# Patient Record
Sex: FEMALE | Race: WHITE | NOT HISPANIC OR LATINO
[De-identification: names, ages, dates, MRNs, and addresses within clinical notes are randomized per-mention and may not be internally consistent; named-entity substitution may affect disease eponyms.]

---

## 2019-11-05 ENCOUNTER — RESULT REVIEW (OUTPATIENT)
Age: 24
End: 2019-11-05

## 2020-10-13 ENCOUNTER — RESULT REVIEW (OUTPATIENT)
Age: 25
End: 2020-10-13

## 2022-08-15 PROBLEM — Z00.00 ENCOUNTER FOR PREVENTIVE HEALTH EXAMINATION: Status: ACTIVE | Noted: 2022-08-15

## 2022-08-16 ENCOUNTER — APPOINTMENT (OUTPATIENT)
Dept: ORTHOPEDIC SURGERY | Facility: CLINIC | Age: 27
End: 2022-08-16

## 2022-08-16 VITALS — BODY MASS INDEX: 19.99 KG/M2 | HEIGHT: 65 IN | WEIGHT: 120 LBS

## 2022-08-16 DIAGNOSIS — S69.91XA UNSPECIFIED INJURY OF RIGHT WRIST, HAND AND FINGER(S), INITIAL ENCOUNTER: ICD-10-CM

## 2022-08-16 PROCEDURE — 73130 X-RAY EXAM OF HAND: CPT | Mod: 50

## 2022-08-16 PROCEDURE — 99072 ADDL SUPL MATRL&STAF TM PHE: CPT

## 2022-08-16 PROCEDURE — 99455 WORK RELATED DISABILITY EXAM: CPT

## 2022-08-16 PROCEDURE — 73110 X-RAY EXAM OF WRIST: CPT | Mod: 50

## 2022-08-16 NOTE — HISTORY OF PRESENT ILLNESS
[Has the patient missed work because of the injury/illness?] : The patient has missed work because of the injury/illness. [No] : The patient is currently not working. 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 She was treated at urgent care. Xrays were done showing a boutonniere deformity. MRI done on 7/18/22 showed mild bone marrow edema around the 4th PIP joints suggestive of a contusion. No evidence of a fracture. No evidence of tendon tear. 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 [FreeTextEntry4] : Splint made in [place] at urgent care along with finger splint and hand splint. MRI done 7/18/22 [FreeTextEntry5] : No

## 2022-08-16 NOTE — ASSESSMENT
[FreeTextEntry1] : 7 weeks status post right wrist injury which localizes to the scapholunate ligament and TFCC not improving despite 6 weeks of splinting and home program.\par \par Residual right fourth PIP swelling after trauma with PIP stiffness which may be related to ligamentous injury.  Stretching of the central slip may be considered despite negative MRIs.\par Patient also demonstrates flexor tendinitis at the level of the left fourth digit A1 pulley.  This is without locking\par \par Differential diagnosis was discussed.\par \par Patient would like to proceed with formal therapy for the right fourth digits and symptoms or not improving with rest and time\par \par We will proceed with an MRI of the right wrist to better assess the ligamentous injury and rule out any occult carpal fractures.\par She was encouraged to continue immobilization of the right wrist while awaiting MRI\par Based on the response to therapy for the right fourth digit and MRI of the right wrist a definitive plan will be considered.\par \par She is scheduled to begin a travel show with [employer] starting on August 25, 2022.  At this time the patient does not feel she will be able to do this but is hopeful that with time and treatment that she may be able to make this date.\par \par This will be further discussed once the results of the MRI are obtained and we assess the response to therapy.

## 2022-08-16 NOTE — PHYSICAL EXAM
[de-identified] : Physical exam shows the patient to be alert and oriented x3, capable of ambulation. The patient is well-developed and well-nourished in no apparent respiratory distress. Majority of the skin is intact bilaterally in the upper extremities without lymphadenopathy at the elbows.\par \par There is tenderness localized over the scapholunate ligament interval of the right wrist as well as the TFCC but without evidence of instability with full range of motion of the wrist bilaterally.  There is no tenderness over the distal radius or CMC joints symmetric bilaterally.\par \par There is swelling and tenderness localized over the right fourth PIP joint specifically over the radial and ulnar collateral ligaments but no evidence of instability of these ligaments.  FDS FDP and extensor mechanism appear to be intact.\par \par Range of motion is 0/90 of the MP 15/80 of the PIP and 0/45 of the DIP joint.\par Passively the range of motion of the PIP goes to 90 and the DIP goes to 60 with soft endpoints\par There is slight hyperextension of the DIP joints of the right third and fourth digits which is similar on the left side.  M[place]geta test shows slight straightening of the DIP joint upon active extension which is similar to the third digit.  \par \par There is good capillary refill of the digits bilaterally.There is no masses or sensitivity over the median and ulnar nerves at the level of the wrist. There is a negative Tinel's and negative Phalen's sign bilaterally. The sensation is grossly intact bilaterally. [de-identified] : PA lateral and oblique of both hands shows joint space is symmetric bilaterally with slight hyperextension of the third DIP joints symmetric bilaterally and slightly greater hyperextension of DIP joint on the right greater than left fourth.  No evidence of fractures or dislocations.\par \par Scapholunate ligament stress views in neutral radial and ulnar deviation shows no evidence of fractures or dislocations but slight widening of the right scapholunate ligament interval is appreciated as compared to the left.  This reduces and radial deviation.\par \par MRI from July 16, 2022 shows mild bone marrow edema of the fourth PIP joint suggestive of contusion but no evidence of fracture.  The flexor and extensor tendons are normal without evidence of tendon tear

## 2022-08-16 NOTE — CONSULT LETTER
[Dear  ___] : Dear  [unfilled], [Consult Letter:] : I had the pleasure of evaluating your patient, [unfilled]. [Please see my note below.] : Please see my note below. [Consult Closing:] : Thank you very much for allowing me to participate in the care of this patient.  If you have any questions, please do not hesitate to contact me. [Sincerely,] : Sincerely, [FreeTextEntry3] : Nelson Rush M.D., FAAOS\par Co-Director\par [facility]\par \par

## 2022-08-18 ENCOUNTER — TRANSCRIPTION ENCOUNTER (OUTPATIENT)
Age: 27
End: 2022-08-18

## 2022-08-22 ENCOUNTER — NON-APPOINTMENT (OUTPATIENT)
Age: 27
End: 2022-08-22

## 2022-08-23 ENCOUNTER — NON-APPOINTMENT (OUTPATIENT)
Age: 27
End: 2022-08-23

## 2022-08-24 ENCOUNTER — APPOINTMENT (OUTPATIENT)
Dept: ORTHOPEDIC SURGERY | Facility: CLINIC | Age: 27
End: 2022-08-24

## 2022-08-24 VITALS — BODY MASS INDEX: 19.99 KG/M2 | WEIGHT: 120 LBS | RESPIRATION RATE: 16 BRPM | HEIGHT: 65 IN

## 2022-08-24 PROCEDURE — 20600 DRAIN/INJ JOINT/BURSA W/O US: CPT | Mod: RT

## 2022-08-24 PROCEDURE — 99213 OFFICE O/P EST LOW 20 MIN: CPT | Mod: 25

## 2022-08-24 PROCEDURE — 20605 DRAIN/INJ JOINT/BURSA W/O US: CPT | Mod: RT

## 2022-08-24 PROCEDURE — 99072 ADDL SUPL MATRL&STAF TM PHE: CPT

## 2022-08-24 NOTE — ASSESSMENT
[FreeTextEntry1] : Status post right TFCC and partial scapholunate ligament tear which is improving slowly with therapy and splinting.\par \par Right fourth finger also improving with therapy at a much faster rate.\par \par Risk benefits and alternatives of treatment were discussed ranging from conservative to surgical intervention.\par \par Risk associated with each option discussed and all questions answered.\par \par Patient would like to proceed with an injection for the right wrist TFCC and scapholunate ligament interval\par \par After the area was cleaned with alcohol to reduce the risk of infection, a half a cc of Kenalog 10-1/2 a cc of 2% lidocaine was placed into the region. Hemostasis was obtained by direct pressure and a Band-Aid applied.  Right TFCC and scapholunate ligament interval\par \par Patient will continue intermittent splinting activity modifications and therapy.\par \par She will be provided with a note stating that she is able to perform without the splints when she feels comfortable but they should be worn during any high risk activity where potential reinjury could occur.\par \par She will continue therapy and home program and we will reevaluate in 4 weeks.

## 2022-08-24 NOTE — HISTORY OF PRESENT ILLNESS
[Right] : right hand dominant [Has the patient missed work because of the injury/illness?] : The patient has missed work because of the injury/illness. [No] : The patient is currently not working. [FreeTextEntry1] : Date of injury: 6/22/2022\par \par Patient here 2 months status post right wrist injury which localized to the scapholunate ligament and TFCC on last exam.  Patient here for possible injection to the TFCC or scapholunate ligament area.  Patient also has right fourth PIP swelling and stiffness related to ligamentous injury.\par \par Patient here to discuss most recent MRI which showed TFCC tear and volar fiber tear of scapholunate ligament.  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

## 2022-08-24 NOTE — PHYSICAL EXAM
[de-identified] : There is residual swelling and tenderness over the right fourth PIP joint as well as the wrist.\par \par There is no instability of the scapholunate ligament or TFCC but there is discomfort upon forced pronation supination flexion extension.  No tenderness over the pisotriquetral or hamate hook.\par \par No tenderness over the area of the ganglion cyst which was visualized on MRI.\par \par Range of motion of the right fourth digit is 0/90 of the MP 0/180 of the PIP and 0/60 of the DIP joint with active equaling passive range of motion.\par \par There is good capillary refill of the digits bilaterally.There is no masses or sensitivity over the median and ulnar nerves at the level of the wrist. There is a negative Tinel's and negative Phalen's sign bilaterally. The sensation is grossly intact bilaterally.

## 2022-09-26 ENCOUNTER — APPOINTMENT (OUTPATIENT)
Dept: ORTHOPEDIC SURGERY | Facility: CLINIC | Age: 27
End: 2022-09-26

## 2022-09-26 VITALS — RESPIRATION RATE: 16 BRPM | WEIGHT: 120 LBS | HEIGHT: 65 IN | BODY MASS INDEX: 19.99 KG/M2

## 2022-09-26 PROCEDURE — 99213 OFFICE O/P EST LOW 20 MIN: CPT

## 2022-09-26 PROCEDURE — 99072 ADDL SUPL MATRL&STAF TM PHE: CPT

## 2022-09-26 RX ORDER — METHYLPREDNISOLONE 4 MG/1
4 TABLET ORAL
Qty: 1 | Refills: 1 | Status: ACTIVE | COMMUNITY
Start: 2022-09-26 | End: 1900-01-01

## 2022-09-26 NOTE — HISTORY OF PRESENT ILLNESS
[FreeTextEntry1] : DOI-6/22/22\par 13 weeks 5 days s/p- right TFCC and partial scapholunate ligament tear and right fourth PIP swelling. 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 She continues O/T 2-4 times  a week. \par She reports feeling dorsal radial and ulnar sided wrist pain which has improved since the initial injury.

## 2022-09-26 NOTE — PHYSICAL EXAM
[de-identified] : Physical exam shows the patient to be alert and oriented x3, capable of ambulation. The patient is well-developed and well-nourished in no apparent respiratory distress. Majority of the skin is intact bilaterally in the upper extremities without lymphadenopathy at the elbows.\par \par There is decreased swelling and tenderness over the scapholunate ligament and TFCC of the right wrist no evidence of instability.\par \par Flexion/extension is 80/60 symmetric bilaterally\par Pronation/supination is 80/80\par \par There is discomfort upon forced flexion extension pronation supination but no instability radial ulnar joint or scapholunate ligament as compared to the opposite side.\par \par There is residual swelling and tenderness over the right fourth PIP joint with the FDS FDP and extensor mechanism intact.\par \par Range of motion 0/90 of the MP 0/100 and a PIP and 0/80 the DIP joint.  Active equals passive range of motion with no tenderness of the A1 pulley.\par \par  strength 20 pounds on the right 70 pounds on the left\par Pinch strength 16 pounds bilaterally\par \par There is good capillary refill of the digits bilaterally.There is no masses or sensitivity over the median and ulnar nerves at the level of the wrist. There is a negative Tinel's and negative Phalen's sign bilaterally. The sensation is grossly intact bilaterally.

## 2022-09-26 NOTE — ASSESSMENT
[FreeTextEntry1] : Patient appears to be improving but does have a history of CRP from previous injuries.  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.\par \par She is cleared to return to work while wearing the splints.\par \par She will continue therapy while she is traveling.\par \par A Medrol Dosepak was also provided for the swelling and for possible early CRP.\par \par If symptoms worsen we will consider referring her out to a pain management doctor for evaluation and possible sympathetic blocks.\par \par Patient will be out of the [place] area until Christmas.\par \par She will follow-up with me at that time.\par \par If things do not continue to improve or deteriorate she will follow-up with a physician 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

## 2022-12-28 ENCOUNTER — APPOINTMENT (OUTPATIENT)
Dept: ORTHOPEDIC SURGERY | Facility: CLINIC | Age: 27
End: 2022-12-28

## 2022-12-28 VITALS — BODY MASS INDEX: 19.99 KG/M2 | WEIGHT: 120 LBS | HEIGHT: 65 IN | RESPIRATION RATE: 16 BRPM

## 2022-12-28 PROCEDURE — 73130 X-RAY EXAM OF HAND: CPT | Mod: RT

## 2022-12-28 PROCEDURE — 99213 OFFICE O/P EST LOW 20 MIN: CPT

## 2022-12-28 PROCEDURE — 99072 ADDL SUPL MATRL&STAF TM PHE: CPT

## 2022-12-28 NOTE — HISTORY OF PRESENT ILLNESS
[Right] : right hand dominant [Has the patient missed work because of the injury/illness?] : The patient has missed work because of the injury/illness. [No] : The patient is currently not working. [FreeTextEntry1] : Patient here 6 months status post right TFCC and partial SL ligament tear with right fourth PIP swelling.  Patient is still splinting intermittently.  Patient is restarting therapy today.\par \par No therapy in October or November.  Patient states the Medrol Dosepak seem to help.  She also has had 1 injection to the TFCC and SL interval.\par \par Patient is on disability until April 3, 2023 and will be living mostly in [place]. 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

## 2022-12-28 NOTE — ASSESSMENT
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  The scapholunate ligament injury appears to be clinically asymptomatic on today's exam.\par \par The right fourth PIP joint appears to be a mild boutonniere secondary to injury of the PIP joint\par \par Both conditions appear to be improving with activity modifications intermittent splinting and therapy.\par \par Options were discussed ranging from conservative management, therapy, injections, or surgical intervention.\par \par Risk associated with each option discussed and all questions answered.\par \par Since strength and symptoms of significantly improved with therapy she will continue with therapy.\par \par She presently is out of work due to a another work-related injury and her work status will be determined on that injury.\par \par Return to the office in 2 months for reevaluation earlier if there are issues or concerns discussed

## 2022-12-28 NOTE — REASON FOR VISIT
[Follow-Up Visit] : a follow-up visit for [Workers' Comp: Date of Injury: _______] : This visit is related to worker's compensation. Date of Injury: [unfilled] [Hand Injury] : hand injury [Wrist Injury] : wrist injury

## 2022-12-28 NOTE — PHYSICAL EXAM
[de-identified] : Physical exam shows the patient to be alert and oriented x3, capable of ambulation. The patient is well-developed and well-nourished in no apparent respiratory distress. Majority of the skin is intact bilaterally in the upper extremities without lymphadenopathy at the elbows.\par \par There is mild swelling and tenderness localized over the right TFCC with pain upon forced pronation and supination which is not present on the opposite wrist. There is no tenderness of the ECU bilaterally. There are no masses palpable at the  level the wrists bilaterally.There is symmetric range of motion of the wrists bilaterally without tenderness over the scaphoid scapholunate or lunotriquetral ligaments and no evidence of instability of the wrists and radioulnar joint bilaterally. No tenderness or instability of the CMC joints, pisotriquetral joint, or hamate hook. \par There is tenderness and swelling of the PIP joint of the right fourth digit with thickening mainly over the ulnar collateral ligament without instability\par \par Range of motion of the right fourth digit 0/90 of the MP 10/110 of the PIP and -10/80 of the DIP joint with active equaling passive range of motion no tenderness over the central slip\par \par  strength 60 pounds bilaterally\par \par Right-hand-dominant\par Abel's test shows excellent flow through the radial and ulnar artery was intact arch bilaterally.\par \par There is excellent capillary of the digits bilaterally with sensation grossly intact bilaterally. [de-identified] : PA lateral and oblique of the right hand shows no evidence of arthritis with joint spaces well-preserved.  The right fourth digit does have a slight extensor lag with hyperextension of the DIP joint as compared to the third digit but no soft tissue calcifications and joints are congruent.

## 2023-03-31 ENCOUNTER — APPOINTMENT (OUTPATIENT)
Dept: ORTHOPEDIC SURGERY | Facility: CLINIC | Age: 28
End: 2023-03-31
Payer: OTHER MISCELLANEOUS

## 2023-03-31 DIAGNOSIS — S69.91XD UNSPECIFIED INJURY OF RIGHT WRIST, HAND AND FINGER(S), SUBSEQUENT ENCOUNTER: ICD-10-CM

## 2023-03-31 DIAGNOSIS — S69.81XD OTHER SPECIFIED INJURIES OF RIGHT WRIST, HAND AND FINGER(S), SUBSEQUENT ENCOUNTER: ICD-10-CM

## 2023-03-31 DIAGNOSIS — S69.91XA UNSPECIFIED INJURY OF RIGHT WRIST, HAND AND FINGER(S), INITIAL ENCOUNTER: ICD-10-CM

## 2023-03-31 PROCEDURE — 99213 OFFICE O/P EST LOW 20 MIN: CPT

## 2023-03-31 NOTE — ASSESSMENT
[FreeTextEntry1] : 9-1/2 months status post right wrist TFCC tear and right fourth central slip tear with residual swelling and discomfort but near full range of motion of the wrist and digits and strength within normal limits.\par \par Patient feels that therapy is beneficial and therefore will continue.\par \par Patient feels that she is able to return to work while wearing the protective straps.\par \par She is cleared to return with to work full duty starting on April 3, 2023.\par \par She will continue to work with the therapist.\par \par She return to the office in 8 weeks if symptoms do not fully resolved.\par \par

## 2023-03-31 NOTE — HISTORY OF PRESENT ILLNESS
[FreeTextEntry1] : Patient returns for f/u 9.5 months s/p right TFCC and partial SL ligament tear with right fourth PIP swelling. Patient notes her pain has slightly improved. She c/o mild ulnar and radial wrist with flexion and extension, and right ring finger pain when making a fist. Patient wears a wrist widget and Coban wrap as needed for support.\par \par Patient had updated bilateral hand x-rays taken on 3/24/23. A report was not provided upon today's visit.

## 2023-03-31 NOTE — PHYSICAL EXAM
[de-identified] : Physical exam shows the patient to be alert and oriented x3, capable of ambulation. The patient is well-developed and well-nourished in no apparent respiratory distress. Majority of the skin is intact bilaterally in the upper extremities without lymphadenopathy at the elbows.\par \par There is mild discomfort upon forced pronation and supination without instability radial ulnar joint and there continues to be tenderness over the TFCC.  No tenderness over the pisotriquetral joint lunotriquetral or ECU.  No evidence of instability.\par \par Flexion/extension 80/60 symmetric bilaterally\par Pronation/supination 80/80 symmetric bilaterally\par \par There is residual swelling of the right fourth PIP joint as compared to the opposite side but no evidence of joint or tendon instability.\par \par Range of motion 0/90 of the MP 0/110 of the PIP 0/80 the DIP joint with active equaling passive range of motion.  Upon making a fist the fourth digit is a half a centimeter distal to the fifth digit which is not passively correctable but soft endpoint.\par \par  strength is 80 pounds symmetric bilaterally\par Right-hand-dominant

## 2023-11-13 ENCOUNTER — NURSE TRIAGE (OUTPATIENT)
Dept: TELEHEALTH | Age: 28
End: 2023-11-13

## 2023-12-17 ENCOUNTER — WALK IN (OUTPATIENT)
Dept: URGENT CARE | Age: 28
End: 2023-12-17

## 2023-12-17 VITALS
BODY MASS INDEX: 21.66 KG/M2 | SYSTOLIC BLOOD PRESSURE: 103 MMHG | HEART RATE: 82 BPM | WEIGHT: 130 LBS | DIASTOLIC BLOOD PRESSURE: 73 MMHG | HEIGHT: 65 IN | OXYGEN SATURATION: 98 % | TEMPERATURE: 98.7 F | RESPIRATION RATE: 18 BRPM

## 2023-12-17 DIAGNOSIS — L30.9 ECZEMA, UNSPECIFIED TYPE: Primary | ICD-10-CM

## 2023-12-17 DIAGNOSIS — L25.9 CONTACT DERMATITIS, UNSPECIFIED CONTACT DERMATITIS TYPE, UNSPECIFIED TRIGGER: ICD-10-CM

## 2023-12-17 PROCEDURE — 99203 OFFICE O/P NEW LOW 30 MIN: CPT | Performed by: PHYSICIAN ASSISTANT

## 2023-12-17 RX ORDER — GABAPENTIN 600 MG/1
TABLET ORAL 3 TIMES DAILY
COMMUNITY

## 2023-12-17 RX ORDER — MONTELUKAST SODIUM 10 MG/1
TABLET ORAL DAILY
COMMUNITY

## 2023-12-17 RX ORDER — NAPROXEN 500 MG/1
500 TABLET ORAL
COMMUNITY

## 2023-12-17 RX ORDER — FLUOXETINE 10 MG/1
10 TABLET, FILM COATED ORAL DAILY
COMMUNITY

## 2023-12-17 RX ORDER — FOLIC ACID 1 MG/1
1000 TABLET ORAL DAILY
COMMUNITY
Start: 2023-10-30

## 2023-12-17 RX ORDER — MIRTAZAPINE 15 MG/1
15 TABLET, FILM COATED ORAL
COMMUNITY

## 2023-12-17 RX ORDER — DESLORATADINE 5 MG/1
5 TABLET, ORALLY DISINTEGRATING ORAL DAILY
COMMUNITY

## 2023-12-17 RX ORDER — FLUOXETINE HYDROCHLORIDE 40 MG/1
CAPSULE ORAL DAILY
COMMUNITY

## 2023-12-17 RX ORDER — LEVOTHYROXINE SODIUM 88 UG/1
TABLET ORAL DAILY
COMMUNITY

## 2023-12-17 RX ORDER — PROPRANOLOL HYDROCHLORIDE 10 MG/1
TABLET ORAL DAILY
COMMUNITY

## 2023-12-17 RX ORDER — GABAPENTIN 300 MG/1
300 CAPSULE ORAL
COMMUNITY

## 2023-12-17 RX ORDER — NABUMETONE 500 MG/1
500 TABLET, FILM COATED ORAL 2 TIMES DAILY PRN
COMMUNITY
Start: 2023-09-29

## 2023-12-17 RX ORDER — CLONAZEPAM 0.5 MG/1
TABLET ORAL 2 TIMES DAILY
COMMUNITY

## 2023-12-17 RX ORDER — CYCLOBENZAPRINE HCL 5 MG
5 TABLET ORAL
COMMUNITY

## 2023-12-17 RX ORDER — DEXTROMETHORPHAN HYDROBROMIDE AND PROMETHAZINE HYDROCHLORIDE 15; 6.25 MG/5ML; MG/5ML
6.25 SYRUP ORAL
COMMUNITY
Start: 2023-06-27

## 2023-12-17 RX ORDER — VENLAFAXINE 100 MG/1
100 TABLET ORAL
COMMUNITY

## 2023-12-17 RX ORDER — SULFASALAZINE 500 MG/1
500 TABLET ORAL
COMMUNITY
Start: 2023-10-27

## 2023-12-17 RX ORDER — TRIAMCINOLONE ACETONIDE 1 MG/G
OINTMENT TOPICAL 2 TIMES DAILY
Qty: 30 G | Refills: 0 | Status: SHIPPED | OUTPATIENT
Start: 2023-12-17

## 2023-12-17 RX ORDER — MAGNESIUM 200 MG
TABLET ORAL
COMMUNITY

## 2023-12-17 RX ORDER — PREDNISONE 10 MG/1
10 TABLET ORAL DAILY
COMMUNITY
Start: 2023-06-27

## 2023-12-17 RX ORDER — CYCLOBENZAPRINE HCL 10 MG
TABLET ORAL 3 TIMES DAILY PRN
COMMUNITY

## 2023-12-17 RX ORDER — THIAMINE HCL 100 MG
TABLET ORAL DAILY
COMMUNITY

## 2023-12-17 RX ORDER — MOLNUPIRAVIR 200 MG/1
CAPSULE ORAL
COMMUNITY
Start: 2023-11-13

## 2023-12-22 ENCOUNTER — OUTPATIENT (OUTPATIENT)
Dept: OUTPATIENT SERVICES | Facility: HOSPITAL | Age: 28
LOS: 1 days | End: 2023-12-22
Payer: OTHER MISCELLANEOUS

## 2023-12-22 PROCEDURE — 73502 X-RAY EXAM HIP UNI 2-3 VIEWS: CPT

## 2023-12-22 PROCEDURE — 73502 X-RAY EXAM HIP UNI 2-3 VIEWS: CPT | Mod: 26,RT

## 2024-03-05 ENCOUNTER — APPOINTMENT (OUTPATIENT)
Dept: DERMATOLOGY | Age: 29
End: 2024-03-05
Attending: PHYSICIAN ASSISTANT

## 2024-10-03 DIAGNOSIS — Z87.820 HISTORY OF BRAIN CONCUSSION: ICD-10-CM

## 2024-10-03 DIAGNOSIS — I73.00 RAYNAUD'S PHENOMENON WITHOUT GANGRENE: Primary | ICD-10-CM

## 2024-10-06 ENCOUNTER — HOSPITAL ENCOUNTER (OUTPATIENT)
Dept: MRI IMAGING | Age: 29
Discharge: HOME | End: 2024-10-06
Attending: INTERNAL MEDICINE

## 2024-10-06 DIAGNOSIS — I73.00 RAYNAUD'S PHENOMENON WITHOUT GANGRENE: ICD-10-CM

## 2024-10-06 DIAGNOSIS — Z87.820 HISTORY OF BRAIN CONCUSSION: ICD-10-CM

## 2024-10-06 PROCEDURE — A9585 GADOBUTROL INJECTION: HCPCS | Performed by: STUDENT IN AN ORGANIZED HEALTH CARE EDUCATION/TRAINING PROGRAM

## 2024-10-06 PROCEDURE — 70553 MRI BRAIN STEM W/O & W/DYE: CPT

## 2024-10-06 PROCEDURE — 70553 MRI BRAIN STEM W/O & W/DYE: CPT | Performed by: RADIOLOGY

## 2024-10-06 PROCEDURE — 10002805 HB CONTRAST AGENT: Performed by: STUDENT IN AN ORGANIZED HEALTH CARE EDUCATION/TRAINING PROGRAM

## 2024-10-06 RX ORDER — GADOBUTROL 604.72 MG/ML
6 INJECTION INTRAVENOUS ONCE
Status: COMPLETED | OUTPATIENT
Start: 2024-10-06 | End: 2024-10-06

## 2024-10-06 RX ADMIN — GADOBUTROL 6 ML: 604.72 INJECTION INTRAVENOUS at 08:37

## 2025-02-15 ENCOUNTER — NEW PATIENT COMPREHENSIVE (OUTPATIENT)
Dept: URBAN - METROPOLITAN AREA CLINIC 56 | Facility: CLINIC | Age: 30
End: 2025-02-15

## 2025-02-15 DIAGNOSIS — F07.81: ICD-10-CM

## 2025-02-15 DIAGNOSIS — G51.4: ICD-10-CM

## 2025-02-15 DIAGNOSIS — G43.B0: ICD-10-CM

## 2025-02-15 DIAGNOSIS — D31.31: ICD-10-CM

## 2025-02-15 DIAGNOSIS — H04.123: ICD-10-CM

## 2025-02-15 DIAGNOSIS — H53.143: ICD-10-CM

## 2025-02-15 DIAGNOSIS — H52.7: ICD-10-CM

## 2025-02-15 PROCEDURE — 92015 DETERMINE REFRACTIVE STATE: CPT

## 2025-02-15 PROCEDURE — 99204 OFFICE O/P NEW MOD 45 MIN: CPT

## 2025-02-15 ASSESSMENT — VISUAL ACUITY
OD_SC: 20/20-2
OS_SC: 20/20
OS_SC: 20/20
OD_SC: 20/20

## 2025-02-15 ASSESSMENT — TONOMETRY
OD_IOP_MMHG: 13
OS_IOP_MMHG: 13